# Patient Record
Sex: FEMALE | Race: WHITE | NOT HISPANIC OR LATINO | URBAN - METROPOLITAN AREA
[De-identification: names, ages, dates, MRNs, and addresses within clinical notes are randomized per-mention and may not be internally consistent; named-entity substitution may affect disease eponyms.]

---

## 2017-11-17 ENCOUNTER — OFFICE VISIT - RIVER FALLS (OUTPATIENT)
Dept: FAMILY MEDICINE | Facility: CLINIC | Age: 22
End: 2017-11-17

## 2017-11-17 ASSESSMENT — MIFFLIN-ST. JEOR: SCORE: 1398.15

## 2018-10-03 ENCOUNTER — AMBULATORY - RIVER FALLS (OUTPATIENT)
Dept: FAMILY MEDICINE | Facility: CLINIC | Age: 23
End: 2018-10-03

## 2019-06-13 ENCOUNTER — OFFICE VISIT - RIVER FALLS (OUTPATIENT)
Dept: FAMILY MEDICINE | Facility: CLINIC | Age: 24
End: 2019-06-13

## 2022-02-12 VITALS
SYSTOLIC BLOOD PRESSURE: 124 MMHG | BODY MASS INDEX: 21.49 KG/M2 | WEIGHT: 137.2 LBS | TEMPERATURE: 97.4 F | DIASTOLIC BLOOD PRESSURE: 88 MMHG | HEART RATE: 100 BPM

## 2022-02-12 VITALS
SYSTOLIC BLOOD PRESSURE: 124 MMHG | HEART RATE: 70 BPM | HEIGHT: 67 IN | BODY MASS INDEX: 21.47 KG/M2 | DIASTOLIC BLOOD PRESSURE: 62 MMHG | WEIGHT: 136.8 LBS

## 2022-02-15 NOTE — PROGRESS NOTES
Patient:   LUCILLE MATHIAS            MRN: [id number]            FIN: [id number]               Age:   23 years     Sex:  Female     :  1995   Associated Diagnoses:   Acute UTI   Author:   Noble Oden MD      Visit Information      Date of Service: 2019 05:39 pm  Performing Location: [facility]  Encounter#: [id number]      Primary Care Provider (PCP):  NONE ,       Referring Provider:  Noble Oden MD    NPI# [id number]      Chief Complaint   2019 5:41 PM CDT    Patient is here for possible UTI. c/o burning with urination, increased urination. Started last night. Started taking Penicillin on Saturday for Strep.        Subjective   Chief complaint 2019 5:41 PM CDT    Patient is here for possible UTI. c/o burning with urination, increased urination. Started last night. Started taking Penicillin on Saturday for Strep.  .     see chief complaint as noted above and confirmed with the patient  no nausea, no vomiting, no back pain      Health Status   Allergies:    Allergic Reactions (Selected)  No Known Medication Allergies   Medications:  (Selected)   Prescriptions  Prescribed  sulfamethoxazole-trimethoprim 800 mg-160 mg oral tablet: 1 tab(s), Oral, bid, x 7 day(s), # 14 tab(s), 0 Refill(s), Type: Acute, Pharmacy: [facility], 1 tab(s) Oral bid,x7 day(s)  Documented Medications  Documented  Kyleena 19.5 mg intrauterine device: = 1 EA ( 19.5 mg ), Intrauteral, once, 0 Refill(s), Type: Maintenance  penicillin V potassium 500 mg oral tablet: = 1 tab(s) ( 500 mg ), Oral, q6 hrs, 0 Refill(s), Type: Maintenance   Problem list:    All Problems  Seasonal allergies / [id number] / Confirmed      Objective   Vital Signs   2019 5:41 PM CDT Temperature Tympanic 97.4 DegF  LOW    Peripheral Pulse Rate 100 bpm    HR Method Electronic    Systolic Blood Pressure 124 mmHg    Diastolic Blood Pressure 88 mmHg  HI    Mean Arterial Pressure 100 mmHg    BP Site  Right arm    BP Method Manual      Measurements from flowsheet : Measurements   6/13/2019 5:41 PM CDT    Weight Measured - Standard                137.2 lb     General:  Alert and oriented, No acute distress.    Respiratory:  Respirations are non-labored.    Cardiovascular:  No edema.    Genitourinary:  No costovertebral angle tenderness.    Psychiatric:  Cooperative, Appropriate mood & affect, Normal judgment.       Impression and Plan   Assessment and Plan:          Diagnosis: Acute UTI (TIB44-JW N39.0).    Orders      (Selected)   Prescriptions  Prescribed  sulfamethoxazole-trimethoprim 800 mg-160 mg oral tablet: 1 tab(s), Oral, bid, x 7 day(s), # 14 tab(s), 0 Refill(s), Type: Acute, Pharmacy: [facility], 1 tab(s) Oral bid,x7 day(s).     discussed UTI and what to expect and when to return especially fevers, flank pain and nausea vomiting  reviewed fluid intake  discussed antibiotics and risks including yeast infections.     .

## 2022-02-15 NOTE — NURSING NOTE
Comprehensive Intake Entered On:  6/13/2019 5:46 PM CDT    Performed On:  6/13/2019 5:41 PM CDT by Deanne Martínez RN               Summary   Chief Complaint :   Patient is here for possible UTI. c/o burning with urination, increased urination. Started last night. Started taking Penicillin on Saturday for Strep.    Menstrual Status :   Menarcheal   Weight Measured :   137.2 lb(Converted to: 137 lb 3 oz, 62.23 kg)    Systolic Blood Pressure :   124 mmHg   Diastolic Blood Pressure :   88 mmHg (HI)    Mean Arterial Pressure :   100 mmHg   Peripheral Pulse Rate :   100 bpm   BP Site :   Right arm   BP Method :   Manual   HR Method :   Electronic   Temperature Tympanic :   97.4 DegF(Converted to: 36.3 DegC)  (LOW)    Deanne Martínez RN - 6/13/2019 5:41 PM CDT   Health Status   Allergies Verified? :   Yes   Medication History Verified? :   Yes   Pre-Visit Planning Status :   Not completed   Tobacco Use? :   Never smoker   Deanne Martínez RN - 6/13/2019 5:41 PM CDT   Consents   Consent for Immunization Exchange :   Consent Granted   Consent for Immunizations to Providers :   Consent Granted   Deanne Martínez RN - 6/13/2019 5:41 PM CDT   Meds / Allergies   (As Of: 6/13/2019 5:46:38 PM CDT)   Allergies (Active)   No Known Medication Allergies  Estimated Onset Date:   Unspecified ; Created By:   Kassie Mcdonald CMA; Reaction Status:   Active ; Category:   Drug ; Substance:   No Known Medication Allergies ; Type:   Allergy ; Updated By:   Kassie Mcdonald CMA; Reviewed Date:   6/13/2019 5:43 PM CDT        Medication List   (As Of: 6/13/2019 5:46:38 PM CDT)   Prescription/Discharge Order    predniSONE  :   predniSONE ; Status:   Completed ; Ordered As Mnemonic:   predniSONE 10 mg oral tablet ; Simple Display Line:   30 mg, 3 tab(s), PO, Daily, for 7 day(s), 21 tab(s), 0 Refill(s) ; Ordering Provider:   Kathleen Higginbotham; Catalog Code:   predniSONE ; Order Dt/Tm:   11/17/2017 3:49:13 PM          cyclobenzaprine  :   cyclobenzaprine ; Status:    Processing ; Ordered As Mnemonic:   cyclobenzaprine 10 mg oral tablet ; Ordering Provider:   Kathleen Higginbotham; Action Display:   Complete ; Catalog Code:   cyclobenzaprine ; Order Dt/Tm:   6/13/2019 5:44:31 PM            Home Meds    penicillin V potassium  :   penicillin V potassium ; Status:   Documented ; Ordered As Mnemonic:   penicillin V potassium 500 mg oral tablet ; Simple Display Line:   500 mg, 1 tab(s), Oral, q6 hrs, 0 Refill(s) ; Catalog Code:   penicillin V potassium ; Order Dt/Tm:   6/13/2019 5:43:58 PM          levonorgestrel  :   levonorgestrel ; Status:   Documented ; Ordered As Mnemonic:   Kyleena 19.5 mg intrauterine device ; Simple Display Line:   19.5 mg, 1 EA, Intrauteral, once, 0 Refill(s) ; Catalog Code:   levonorgestrel ; Order Dt/Tm:   6/13/2019 5:44:25 PM          etonogestrel  :   etonogestrel ; Status:   Completed ; Ordered As Mnemonic:   Nexplanon 68 mg subcutaneous implant ; Simple Display Line:   68 mg, 1 EA, subcutaneous, once, placed March 2015 left arm, 0 Refill(s) ; Catalog Code:   etonogestrel ; Order Dt/Tm:   11/17/2017 3:26:43 PM

## 2022-02-15 NOTE — PROGRESS NOTES
Patient:   LUCILLE MATHIAS            MRN: [id number]            FIN: [id number]               Age:   21 years     Sex:  Female     :  1995   Associated Diagnoses:   Left torticollis   Author:   Kathleen Higginbotham      Visit Information      Date of Service: 2017 03:12 pm  Performing Location: [facility]  Encounter#: [id number]      Primary Care Provider (PCP):  NONE ,       Referring Provider:  Kathleen Higginbotham    NPI# [id number]      Chief Complaint   2017 3:23 PM CST   Pt c/o neck/upper back pain, has become worse in the past week        History of Present Illness   reviewed presenting problem as above with patient  onset neck and back pain about 1 month ago but it seems much worse last 4 days.  Very tight and throbbing and now difficult to move turn head to left.   Easier to turn to left  She has used some ibuprofen for it.  She studied all day the day before  No fall or trauma  No hx surgery   No hospitalization  no meds except nexplanon  Non smoker, no ETOH    [employer] student, studying biology.      Review of Systems   Constitutional:  No fever, No chills.    Ear/Nose/Mouth/Throat:  No ear pain, No nasal congestion, No sore throat.    Respiratory:  No shortness of breath, No cough, No wheezing.    Gastrointestinal:  No nausea, No vomiting, No diarrhea.              Health Status   Allergies:    Allergic Reactions (Selected)  No Known Medication Allergies   Medications:  (Selected)   Documented Medications  Documented  Nexplanon 68 mg subcutaneous implant: 1 EA ( 68 mg ), subcutaneous, once, Instructions: placed 2015 left arm, 0 Refill(s), Type: Maintenance   Problem list:    No problem items selected or recorded.      Histories   Past Medical History:    No active or resolved past medical history items have been selected or recorded.   Family History:    No family history items have been selected or recorded.   Procedure history:    No active procedure history  items have been selected or recorded.   Social History:             No active social history items have been recorded.      Physical Examination   Vital Signs   11/17/2017 3:23 PM CST Peripheral Pulse Rate 70 bpm    Pulse Site Radial artery    HR Method Manual    Systolic Blood Pressure 124 mmHg    Diastolic Blood Pressure 62 mmHg    Mean Arterial Pressure 83 mmHg    BP Site Right arm    BP Method Manual      Measurements from flowsheet : Measurements   11/17/2017 3:23 PM CST Height Measured - Standard 67 in    Weight Measured - Standard 136.8 lb    BSA 1.71 m2    Body Mass Index 21.42 kg/m2      General:  Alert and oriented, No acute distress.    Eye:  Normal conjunctiva.    HENT:  Tympanic membranes are clear, Normal hearing, Oral mucosa is moist, No pharyngeal erythema, No sinus tenderness.    Neck:  Supple, Non-tender, No lymphadenopathy.    Respiratory:  Lungs are clear to auscultation, Respirations are non-labored, Breath sounds are equal, Symmetrical chest wall expansion.    Cardiovascular:  Normal rate, Regular rhythm, No murmur.    Musculoskeletal:  Normal range of motion, Normal gait, she has some tightness in the muscles latteral left neck, limited rom neck.    Integumentary:  Warm, Dry, Pink, No rash.    Neurologic:  Alert, Oriented.    Psychiatric:  Cooperative.       Impression and Plan   Diagnosis     Left torticollis (TIY72-JQ M43.6).     Patient Instructions:       Counseled: Patient, Regarding diagnosis, Regarding treatment, Regarding medications, Verbalized understanding, Counseled on symptomatic management. Return to clinic for re evaluation if worsening, simply not improving, or failure to resolve.   .    Orders     Orders (Selected)   Prescriptions  Prescribed  cyclobenzaprine 10 mg oral tablet: 1 tab(s) ( 10 mg ), PO, TID, PRN: for spasm, # 30 tab(s), 0 Refill(s), Type: Maintenance, Pharmacy: [facility], 1 tab(s) po tid,PRN:for spasm  predniSONE 10 mg oral tablet: 3  tab(s) ( 30 mg ), PO, Daily, # 21 tab(s), 0 Refill(s), Type: Maintenance, [facility] 3 tab(s) po daily,x7 day(s).     heat  massage  .